# Patient Record
Sex: FEMALE | Race: WHITE | NOT HISPANIC OR LATINO | Employment: FULL TIME | URBAN - METROPOLITAN AREA
[De-identification: names, ages, dates, MRNs, and addresses within clinical notes are randomized per-mention and may not be internally consistent; named-entity substitution may affect disease eponyms.]

---

## 2024-03-07 ENCOUNTER — OFFICE VISIT (OUTPATIENT)
Dept: FAMILY MEDICINE | Facility: CLINIC | Age: 34
End: 2024-03-07
Payer: COMMERCIAL

## 2024-03-07 VITALS
TEMPERATURE: 97.1 F | RESPIRATION RATE: 14 BRPM | SYSTOLIC BLOOD PRESSURE: 109 MMHG | HEIGHT: 66 IN | OXYGEN SATURATION: 96 % | WEIGHT: 155 LBS | BODY MASS INDEX: 24.91 KG/M2 | HEART RATE: 94 BPM | DIASTOLIC BLOOD PRESSURE: 74 MMHG

## 2024-03-07 DIAGNOSIS — R11.0 NAUSEA: ICD-10-CM

## 2024-03-07 DIAGNOSIS — Z30.09 ENCOUNTER FOR COUNSELING REGARDING CONTRACEPTION: ICD-10-CM

## 2024-03-07 DIAGNOSIS — G47.00 INSOMNIA, UNSPECIFIED TYPE: Primary | ICD-10-CM

## 2024-03-07 DIAGNOSIS — Z13.220 SCREENING CHOLESTEROL LEVEL: ICD-10-CM

## 2024-03-07 DIAGNOSIS — L40.9 SCALP PSORIASIS: ICD-10-CM

## 2024-03-07 DIAGNOSIS — Z11.59 NEED FOR HEPATITIS C SCREENING TEST: ICD-10-CM

## 2024-03-07 DIAGNOSIS — R53.83 FATIGUE, UNSPECIFIED TYPE: ICD-10-CM

## 2024-03-07 LAB
ALBUMIN SERPL BCG-MCNC: 5 G/DL (ref 3.5–5.2)
ALP SERPL-CCNC: 82 U/L (ref 40–150)
ALT SERPL W P-5'-P-CCNC: 16 U/L (ref 0–50)
ANION GAP SERPL CALCULATED.3IONS-SCNC: 9 MMOL/L (ref 7–15)
AST SERPL W P-5'-P-CCNC: 18 U/L (ref 0–45)
BILIRUB SERPL-MCNC: 0.2 MG/DL
BUN SERPL-MCNC: 13.6 MG/DL (ref 6–20)
CALCIUM SERPL-MCNC: 9.5 MG/DL (ref 8.6–10)
CHLORIDE SERPL-SCNC: 103 MMOL/L (ref 98–107)
CHOLEST SERPL-MCNC: 148 MG/DL
CREAT SERPL-MCNC: 0.83 MG/DL (ref 0.51–0.95)
DEPRECATED HCO3 PLAS-SCNC: 28 MMOL/L (ref 22–29)
EGFRCR SERPLBLD CKD-EPI 2021: >90 ML/MIN/1.73M2
ERYTHROCYTE [DISTWIDTH] IN BLOOD BY AUTOMATED COUNT: 11.6 % (ref 10–15)
FASTING STATUS PATIENT QL REPORTED: YES
FERRITIN SERPL-MCNC: 72 NG/ML (ref 6–175)
GLUCOSE SERPL-MCNC: 96 MG/DL (ref 70–99)
HCT VFR BLD AUTO: 40.4 % (ref 35–47)
HDLC SERPL-MCNC: 64 MG/DL
HGB BLD-MCNC: 13.9 G/DL (ref 11.7–15.7)
LDLC SERPL CALC-MCNC: 76 MG/DL
MCH RBC QN AUTO: 30.7 PG (ref 26.5–33)
MCHC RBC AUTO-ENTMCNC: 34.4 G/DL (ref 31.5–36.5)
MCV RBC AUTO: 89 FL (ref 78–100)
NONHDLC SERPL-MCNC: 84 MG/DL
PLATELET # BLD AUTO: 328 10E3/UL (ref 150–450)
POTASSIUM SERPL-SCNC: 4.1 MMOL/L (ref 3.4–5.3)
PROT SERPL-MCNC: 7.3 G/DL (ref 6.4–8.3)
RBC # BLD AUTO: 4.53 10E6/UL (ref 3.8–5.2)
SODIUM SERPL-SCNC: 140 MMOL/L (ref 135–145)
TRIGL SERPL-MCNC: 40 MG/DL
TSH SERPL DL<=0.005 MIU/L-ACNC: 1.13 UIU/ML (ref 0.3–4.2)
WBC # BLD AUTO: 5.1 10E3/UL (ref 4–11)

## 2024-03-07 PROCEDURE — 85027 COMPLETE CBC AUTOMATED: CPT | Performed by: FAMILY MEDICINE

## 2024-03-07 PROCEDURE — 84443 ASSAY THYROID STIM HORMONE: CPT | Performed by: FAMILY MEDICINE

## 2024-03-07 PROCEDURE — 99204 OFFICE O/P NEW MOD 45 MIN: CPT | Performed by: FAMILY MEDICINE

## 2024-03-07 PROCEDURE — 80053 COMPREHEN METABOLIC PANEL: CPT | Performed by: FAMILY MEDICINE

## 2024-03-07 PROCEDURE — 86803 HEPATITIS C AB TEST: CPT | Performed by: FAMILY MEDICINE

## 2024-03-07 PROCEDURE — 36415 COLL VENOUS BLD VENIPUNCTURE: CPT | Performed by: FAMILY MEDICINE

## 2024-03-07 PROCEDURE — 80061 LIPID PANEL: CPT | Performed by: FAMILY MEDICINE

## 2024-03-07 PROCEDURE — 82728 ASSAY OF FERRITIN: CPT | Performed by: FAMILY MEDICINE

## 2024-03-07 RX ORDER — COPPER 313.4 MG/1
1 INTRAUTERINE DEVICE INTRAUTERINE ONCE
COMMUNITY

## 2024-03-07 RX ORDER — CLOBETASOL PROPIONATE 0.5 MG/G
AEROSOL, FOAM TOPICAL 2 TIMES DAILY PRN
COMMUNITY
Start: 2023-06-27 | End: 2024-03-07

## 2024-03-07 RX ORDER — ONDANSETRON 4 MG/1
4 TABLET, ORALLY DISINTEGRATING ORAL EVERY 8 HOURS PRN
Qty: 20 TABLET | Refills: 0 | Status: SHIPPED | OUTPATIENT
Start: 2024-03-07

## 2024-03-07 RX ORDER — TRAZODONE HYDROCHLORIDE 50 MG/1
50-100 TABLET, FILM COATED ORAL AT BEDTIME
Qty: 60 TABLET | Refills: 3 | Status: SHIPPED | OUTPATIENT
Start: 2024-03-07

## 2024-03-07 RX ORDER — CLOBETASOL PROPIONATE 0.5 MG/G
AEROSOL, FOAM TOPICAL 2 TIMES DAILY PRN
Qty: 100 G | Refills: 3 | Status: SHIPPED | OUTPATIENT
Start: 2024-03-07

## 2024-03-07 ASSESSMENT — PAIN SCALES - GENERAL: PAINLEVEL: NO PAIN (0)

## 2024-03-07 NOTE — PROGRESS NOTES
Assessment & Plan     Insomnia, unspecified type  We discussed treatment options and she was given a prescription for trazodone.  She can titrate the dose up to 100 mg at bedtime if needed.  She will continue to work on sleep hygiene strategies.  - traZODone (DESYREL) 50 MG tablet; Take 1-2 tablets ( mg) by mouth at bedtime    Scalp psoriasis  She was given a refill of clobetasol and a referral to dermatology.  - Adult Dermatology  Referral; Future  - clobetasol propionate (OLUX) 0.05 % external foam; Apply topically 2 times daily as needed (2x as day for 2 weeks when flare ups happen)    Need for hepatitis C screening test  - Hepatitis C Screen Reflex to HCV RNA Quant and Genotype; Future  - Hepatitis C Screen Reflex to HCV RNA Quant and Genotype    Screening cholesterol level  We are going to check nonfasting cholesterol panel today.  - Lipid Profile (Chol, Trig, HDL, LDL calc); Future  - Lipid Profile (Chol, Trig, HDL, LDL calc)    Fatigue, unspecified type  She has struggled with fatigue for quite some time.  Symptoms may be due to the insomnia or related to stress from work.  We are checking labs as listed below to rule out any metabolic causes.  There is a strong family history of thyroid disease as well.  Her thyroid testing was normal in 2022.    - TSH with free T4 reflex; Future  - CBC with platelets; Future  - Ferritin; Future  - Comprehensive metabolic panel (BMP + Alb, Alk Phos, ALT, AST, Total. Bili, TP); Future  - TSH with free T4 reflex  - CBC with platelets  - Ferritin  - Comprehensive metabolic panel (BMP + Alb, Alk Phos, ALT, AST, Total. Bili, TP)    Nausea  She was given a prescription for Zofran to be used as needed.  - ondansetron (ZOFRAN ODT) 4 MG ODT tab; Take 1 tablet (4 mg) by mouth every 8 hours as needed for nausea    Contraception counseling  She describes having quite a bit of vaginal bleeding with the ParaGard IUD.  She reports that this was placed in 2017.  She is  hoping to have it removed and replaced with Mirena.  She describes having IUDs in the past and has never had problems getting them inserted or removed.                  Carolina Ruiz is a 33 year old, presenting for the following health issues:  Establish [facility]        3/7/2024    10:41 AM   Additional Questions   Roomed by Paco OLIVAREZ     Via the Health Maintenance questionnaire, the patient has reported the following services have been completed -Cervical Cancer Screening, this information has been sent to the abstraction team.  History of Present Illness       Reason for visit:  Establish care    She eats 2-3 servings of fruits and vegetables daily.She consumes 0 sweetened beverage(s) daily.She exercises with enough effort to increase her heart rate 20 to 29 minutes per day.  She exercises with enough effort to increase her heart rate 3 or less days per week.   She is taking medications regularly.     Medication Refills    Sleep Issues  -Wakes up frequently throughout the night.  She has tried melatonin which does not help much.  She has also been working on sleep hygiene strategies, but the symptoms continue to persist.  She is interested in trying a medication if possible.  -No real issues falling asleep    She has struggled with fatigue for quite some time.  Symptoms may be due to the insomnia or related to stress from work.  Lab workup in the past has been unrevealing.  There is a strong family history of thyroid disease as well.  Her thyroid testing was normal in 2022.    She describes having quite a bit of vaginal bleeding with the ParaGard IUD.  She reports that this was placed in 2017.  She is hoping to have it removed and replaced with Mirena.  She describes having IUDs in the past and has never had problems getting them inserted or removed.    She has a history of scalp psoriasis and uses clobetasol shampoo.  She has not seen dermatology recently for this and needs to establish care with a new  "provider.    She describes having nausea symptoms occasionally when traveling to [place].  She has a trip coming up and is requesting a small prescription of Zofran.    Social history: She works as a hospitalist at [employer]                    Objective    /74   Pulse 94   Temp 97.1  F (36.2  C) (Temporal)   Resp 14   Ht 1.683 m (5' 6.25\")   Wt 70.3 kg (155 lb)   LMP 03/04/2024 (Exact Date)   SpO2 96%   BMI 24.83 kg/m    Body mass index is 24.83 kg/m .  Physical Exam   GENERAL: alert and no distress  EYES: Eyes grossly normal to inspection, PERRL and conjunctivae and sclerae normal  HENT: ear canals and TM's normal, nose and mouth without ulcers or lesions  NECK: no adenopathy, no asymmetry, masses, or scars  RESP: lungs clear to auscultation - no rales, rhonchi or wheezes  CV: regular rate and rhythm, normal S1 S2, no S3 or S4, no murmur, click or rub, no peripheral edema  ABDOMEN: soft, nontender, no hepatosplenomegaly, no masses and bowel sounds normal  MS: no gross musculoskeletal defects noted, no edema  SKIN: There is a scaly plaque-like rash consistent with psoriasis in the posterior scalp.  NEURO: Normal strength and tone, mentation intact and speech normal  PSYCH: mentation appears normal, affect normal/bright            Signed Electronically by: Luis Braden, DO    "

## 2024-03-08 LAB — HCV AB SERPL QL IA: NONREACTIVE

## 2024-03-10 ENCOUNTER — HEALTH MAINTENANCE LETTER (OUTPATIENT)
Age: 34
End: 2024-03-10

## 2024-03-11 ENCOUNTER — TELEPHONE (OUTPATIENT)
Dept: FAMILY MEDICINE | Facility: CLINIC | Age: 34
End: 2024-03-11

## 2024-03-11 NOTE — TELEPHONE ENCOUNTER
Called patient and lvm to call clinic back at [phone].   When patient calls back, please schedule her for iud replacement, requesting mirena w/ FS.   Please see DE's note below for details.     Luis Gaviria, DO  [facility]  Please reach out to this patient and help her schedule an IUD removal and insertion.  She would like to have her ParaGard IUD removed and get the Mirena.  I spoke with Dr. Macias who would be happy to see this patient for this procedure without a prior consultation.  Thank you, GWEN JENSEN

## 2024-09-30 ENCOUNTER — OFFICE VISIT (OUTPATIENT)
Dept: OBGYN | Facility: CLINIC | Age: 34
End: 2024-09-30
Payer: COMMERCIAL

## 2024-09-30 VITALS
OXYGEN SATURATION: 97 % | SYSTOLIC BLOOD PRESSURE: 104 MMHG | BODY MASS INDEX: 25.31 KG/M2 | TEMPERATURE: 98 F | HEART RATE: 79 BPM | DIASTOLIC BLOOD PRESSURE: 71 MMHG | WEIGHT: 158 LBS

## 2024-09-30 DIAGNOSIS — N94.10 DYSPAREUNIA IN FEMALE: ICD-10-CM

## 2024-09-30 DIAGNOSIS — Z30.433 ENCOUNTER FOR REMOVAL AND REINSERTION OF INTRAUTERINE CONTRACEPTIVE DEVICE: Primary | ICD-10-CM

## 2024-09-30 PROCEDURE — 58300 INSERT INTRAUTERINE DEVICE: CPT | Performed by: OBSTETRICS & GYNECOLOGY

## 2024-09-30 PROCEDURE — 99459 PELVIC EXAMINATION: CPT | Performed by: OBSTETRICS & GYNECOLOGY

## 2024-09-30 PROCEDURE — 99203 OFFICE O/P NEW LOW 30 MIN: CPT | Mod: 25 | Performed by: OBSTETRICS & GYNECOLOGY

## 2024-09-30 PROCEDURE — 58301 REMOVE INTRAUTERINE DEVICE: CPT | Performed by: OBSTETRICS & GYNECOLOGY

## 2024-09-30 NOTE — PATIENT INSTRUCTIONS
IUD information:     Benefits: The IUD can be 97-99% effective when carefully following directions regarding use. It can be more effective if used with additional contraception. IUD containing progestin may decrease menstrual flow and menstrual cramping.     Risks/Side Effects: include but are not limited to spotting, bleeding, hemorrhage, or anemia: cramping or pain: partial or complete expulsion of device; lost IUD strings; uterine or cervical perforation; embedding of IUD in the uterine wall; increased risk of pelvic inflammatory disease. Women who become pregnant with an IUD in place are at a higher risk for ectopic pregnancy should a pregnancy occur with an IUD in situ. There is a higher rate of miscarriage when pregnancy occurs with IUD in place.    Warning signs: Please call clinic if you have abnormal spotting or heavy bleeding, abdominal pain, dyspareunia, fever, chills, flu like symptoms, or unable to locate strings of IUD, or strings are longer or shorter than expected.    You are encouraged to use condoms for prevention of STD. You may also need back up contraception for 7 days with your IUD. You may use pain medications (ibuprofen) as needed for mild to moderate pain. Please follow-up in clinic in 4-6 weeks for IUD string check if unable to find strings or as directed by provider.    What Mirena Users May Expect    What to watch for right after Mirena is placed  Some women may experience uterine cramps, bleeding, and/or dizziness during and right after Mirena is placed. To help minimize the cramps, you may taken ibuprofen 600 mg with food prior to your appointment. These symptoms should improve over the next 24 hours.  Mild cramping may be present for a few days after your placement  As a follow up, you should check your strings on 4 weeks or visit your clinic once in the first 4 to 12 weeks after Mirena is placed to make sure it is in the right position. After that, Mirena can be checked once a year as  part of your routine exam.    Please use a back-up method (abstinence or condoms) for 5 days after placement.    Your periods may change  For the first 3 to 6 months, your monthly period may become irregular. You may also have frequent spotting or light bleeding. A few women have heavy bleeding during this time. After your body adjusts, the number of bleeding days is likely to decrease (but may remain irregular), and you may even find that your periods stop altogether for as long as Mirena is in place. Around the end of the third month of use, you may see up to a 75% reduction in the amount of menstrual bleeding. By one year, about 1 out of 5 users may hay have no period at all. At the end of two years, 70% have little or no bleeding. Your periods will return rapidly once Mirena is removed.     Mirena Strings  You may check your own Mirena strings by inserting a finger into the vagina and feeling the strings as they exit the cervix.  The strings will initially feel firm, like fishing line, but will soften over a few weeks.  After the strings have softened, you or your partner should not be able to feel the strings during intercourse.  If you can feel the IUD, see your healthcare provider to have the position confirmed.  You may use tampons with Mirena in place.    Mirena does not protect against HIV or STDs.  Mirena does not prevent the formation of ovarian cysts.  Mirena does not typically reduce acne or cause weight gain or mood changes.    Please call [facility] at [phone] if you have questions or concerns.    For more information:  [email or web address]

## 2024-09-30 NOTE — PROGRESS NOTES
[facility]- OBGYN    CC: heavy period with copper IUD    S:Sara Martinez is a 34 year old  who presents today for copper IUD removal and Mirena IUD insertion.  Patient reports she had copper IUD placed in .  This was at the suggestion of her provider because she was having acne issues with Denise IUD use.  She notes her periods prior to getting the copper IUD were lighter and less painful.  Currently periods with copper IUD are heavy and uncomfortable.  She is interested in switching back to progesterone IUD.  She also notes a history of pain with insertion during intercourse.  Had worked with pelvic PT in the past at [facility] and didn't find it very helpful.  She is interested in giving pelvic floor PT another chance.    OBGYN Hx:     Patient's last menstrual period was 2024.  Menses:regular, monthly  Sexually active with one male partner  STD Hx:none  Pap hx: 22 NILM HPV negative.  Denies any history of abnormal.     PMH: psoriasis  PSH:none  Meds:trazodone PRN  Allergies:NKDA  SH: denies any tobacco use  O: Patient Vitals for the past 24 hrs:   BP Temp Temp src Pulse SpO2 Weight   24 1421 104/71 98  F (36.7  C) Oral 79 97 % 71.7 kg (158 lb)     Exam: normal appearing external genitalia, normal appearing vaginal mucosa, normal appearing cervix with IUD strings visible.  Small amount of thin white vaginal discharge.     Is a pregnancy test required: No.  Was a consent obtained?  Yes    Sara Martinez understands she may have the IUD removed at any time. IUD should be removed by a health care provider and the current IUD will be removed today.    The entire removal and insertion procedure was reviewed with the patient, including care after placement.    PROCEDURE:    Patient was counseled on pain control options including lidocaine gel and paracervical block.  Patient opts for lidocaine gel.   A medium gee speculum exam was performed and the cervix was  visualized. The IUD string was visualized. Using ring forceps, the string was grasped and the IUD removed intact.    Under sterile technique, cervix was visualized with speculum and prepped with Betadine solution swab x 3. Tenaculum was placed for stability. The uterus was gently straightened and sounded to 7.5 cm using endometrial pipelle. IUD prepared for placement, and IUD inserted according to 's instructions without difficulty or significant ressitance, and deployed at the fundus. The strings were visualized and trimmed to 2.5 cm from the external os. Tenaculum was removed and hemostasis noted. Speculum removed.  Patient tolerated procedure well.    EBL: minimal    Complications: none      POST PROCEDURE:    Given 's handouts, including when to have IUD removed, list of danger s/sx, side effects and follow up recommended. Encouraged condom use for prevention of STD. Advised to call for any fever, for prolonged or severe pain or bleeding, abnormal vaginal dischage, or unable to palpate strings. She was advised to use pain medications (ibuprofen) as needed for mild to moderate pain. Advised to follow-up in clinic in 4-6 weeks for IUD string check if unable to find strings or as directed by provider.     (Z30.433) Encounter for removal and reinsertion of intrauterine contraceptive device  (primary encounter diagnosis)  Comment: see above  Plan: levonorgestrel (MIRENA) 52 MG (20 mcg/day) IUD         1 each, INSERTION INTRAUTERINE DEVICE, REMOVE         INTRAUTERINE DEVICE    (N94.10) Dyspareunia in female  Comment: Patient with pain with insertion.  Previously told it was due to hypertonic pelvic floor muscles.  Interested in pelvic floor PT referral  Plan: Physical Therapy  Referral          Nataliya Echevarria MD

## 2024-11-08 ENCOUNTER — THERAPY VISIT (OUTPATIENT)
Dept: PHYSICAL THERAPY | Facility: CLINIC | Age: 34
End: 2024-11-08
Payer: COMMERCIAL

## 2024-11-08 DIAGNOSIS — N94.10 DYSPAREUNIA IN FEMALE: ICD-10-CM

## 2024-11-08 DIAGNOSIS — R10.2 PELVIC PAIN IN FEMALE: Primary | ICD-10-CM

## 2024-11-08 PROCEDURE — 97161 PT EVAL LOW COMPLEX 20 MIN: CPT | Mod: GP

## 2024-11-08 PROCEDURE — 97530 THERAPEUTIC ACTIVITIES: CPT | Mod: GP

## 2024-11-08 NOTE — PROGRESS NOTES
PHYSICAL THERAPY EVALUATION  Type of Visit: Evaluation              Subjective         Presenting condition or subjective complaint: (Patient-Rptd) Dyspareunia  Patient reports to outpatient physical therapy with pelvic pain that started over a year ago. She has pain with initial insertion of tampons and then gets better. Also has discomfort with initial penetration - used to go away and now just stays. Uses lubrication and that has not been helpful. She has tried pelvic PT in the past and tried an wand which did seem as helpful. No issues with voiding. Has a hx of constipation. Seemed to start around the same time as the pelvic pain. Hard to pass and hard stool. Has tried stool softeners. Has tried a squatty potty as well. Denies back or hip pain.    Date of onset: 09/30/24 (date of order)    Relevant medical history:     Past Medical History:   Diagnosis Date    Psoriasis      Dates & types of surgery:    No past surgical history on file.  Prior diagnostic imaging/testing results:       Prior therapy history for the same diagnosis, illness or injury: (Patient-Rptd) Yes      Prior Level of Function  Transfers: Independent  Ambulation: Independent  ADL: Independent  IADL:     Living Environment  Social support: (Patient-Rptd) With a significant other or spouse   Type of home: (Patient-Rptd) House   Stairs to enter the home: (Patient-Rptd) Yes   Is there a railing: (Patient-Rptd) Yes     Ramp: (Patient-Rptd) No   Stairs inside the home: (Patient-Rptd) Yes   Is there a railing: (Patient-Rptd) Yes     Help at home: (Patient-Rptd) None  Equipment owned:       Employment: (Patient-Rptd) Yes    Hobbies/Interests:      Patient goals for therapy:      Pain assessment: Pain present     Objective      PELVIC EVALUATION  ADDITIONAL HISTORY:  Sex assigned at birth: (Patient-Rptd) Female  Gender identity: (Patient-Rptd) Female    Pronouns: (Patient-Rptd) She/Her Hers      Bladder History:  Feels bladder filling:    Triggers for  feeling of inability to wait to go to the bathroom: (Patient-Rptd) No    How long can you wait to urinate:    Gets up at night to urinate: (Patient-Rptd) No    Can stop the flow of urine when urinating: (Patient-Rptd) Yes  Volume of urine usually released: (Patient-Rptd) Average   Other issues:    Number of bladder infections in last 12 months:    Fluid intake per day:        Medications taken for bladder: (Patient-Rptd) No     Activities causing urine leak:      Amount of urine typically leaked:    Pads used to help with leaking:          Bowel History:  Frequency of bowel movement: (Patient-Rptd) 1 per day  Consistency of stool: (Patient-Rptd) Hard    Ignores the urge to defecate: (Patient-Rptd) No  Other bowel issues: (Patient-Rptd) Straining to have bowel movement  Length of time spent trying to have a bowel movement:      Sexual Function History:  Sexual orientation: (Patient-Rptd) Straight    Sexually active: (Patient-Rptd) Yes  Lubrication used: (Patient-Rptd) Yes (Patient-Rptd) Yes  Pelvic pain: (Patient-Rptd) Initial penetration (rectal or vaginal); Use of tampon    Pain or difficulty with orgasms/erection/ejaculation: (Patient-Rptd) No    State of menopause:    Hormone medications: (Patient-Rptd) No      Are you currently pregnant: (Patient-Rptd) No  Number of previous pregnancies: (Patient-Rptd) 0  Have you been diagnosed with pelvic prolapse or abdominal separation: (Patient-Rptd) No  Do you get regular exercise: (Patient-Rptd) Yes  Have you tried pelvic floor strengthening exercises for 4 weeks: (Patient-Rptd) No  Do you have any history of trauma that is relevant to your care that you d like to share: (Patient-Rptd) No      Discussed reason for referral regarding pelvic health needs and external/internal pelvic floor muscle examination with patient/guardian.  Opportunity provided to ask questions and verbal consent for assessment and intervention was given.    PAIN: Pain Level at Rest: 0/10  Pain Level  with Use: 8/10  Sharper pain during intercourse and no pain afterwards  POSTURE: WFL  LUMBAR SCREEN: AROM WNL  HIP SCREEN:  PROM: WNL flexion, ER and IR bilaterally  Strength:  WNL globally flexion, and and ext bilaterally    Functional Strength Testing: Double Leg Squat: Good technique/no significant findings    BREATHING SYMMETRY: WNL    PELVIC EXAM  External Visual Inspection:  At rest: Elevated perineal body  With voluntary pelvic floor contraction: Present  Relaxation of PFM: Non-relaxation    Integumentary:   Introitus: Tight    External Digital Palpation per Perineum:   Ischiocavernosis: Unremarkable  Bulbo cavernosis: Unremarkable  Transverse perineal: Unremarkable  Levator ani: Tightness  Perineal body: Unremarkable  Coccyx: not assessed    Internal Digital Palpation:  Per Vagina:  Tenderness  Myofascial Resistance to Palpation: Taut, initial layer, soft deeper layer  Digital Muscle Performance: P (Power): 3-4  Compensations: Gluts  Relaxation Post-Contraction: Partial/delayed relaxation    BIOFEEDBACK:  Position: Supine  Surface Electrodes: Perineal    Abdominals:     Perianals:   Baseline muscle activity: 5-7 in hooklying; 3-4 with legs extended microvolts  Peak Amplitude/MVC: 15 microvolts    Assessment & Plan   CLINICAL IMPRESSIONS  Medical Diagnosis: dyspareunia    Treatment Diagnosis:     Impression/Assessment: Patient is a 34 year old female with pelvic floor pain complaints.  The following significant findings have been identified: Pain, Decreased ROM/flexibility, Impaired muscle performance, and Decreased activity tolerance. These impairments interfere with their ability to perform self care tasks, recreational activities, and household mobility as compared to previous level of function.     Clinical Decision Making (Complexity):  Clinical Presentation: Stable/Uncomplicated  Clinical Presentation Rationale: based on medical and personal factors listed in PT evaluation  Clinical Decision Making  (Complexity): Low complexity    PLAN OF CARE  Treatment Interventions:  Modalities: Biofeedback, E-stim, Ultrasound  Interventions: Manual Therapy, Neuromuscular Re-education, Therapeutic Activity, Therapeutic Exercise, Self-Care/Home Management    Long Term Goals     PT Goal 1  Goal Identifier: intercourse  Goal Description: patient will report 1/10 pain or less with initial penetration  Rationale: to maximize safety and independence with performance of ADLs and functional tasks;to maximize safety and independence within the home;to maximize safety and independence with self cares  Target Date: 01/31/25  PT Goal 2  Goal Identifier: tampon use  Goal Description: patient will be able to insert a tampon with 1/10 pain or less  Rationale: to maximize safety and independence with performance of ADLs and functional tasks;to maximize safety and independence within the home;to maximize safety and independence with self cares  Target Date: 01/31/25      Frequency of Treatment:    Duration of Treatment:      Recommended Referrals to Other Professionals: n/a at present  Education Assessment:   Learner/Method: Patient    Risks and benefits of evaluation/treatment have been explained.   Patient/Family/caregiver agrees with Plan of Care.     Evaluation Time:     PT Eval, Low Complexity Minutes (39029): 30     Signing Clinician: Shantel Jones, PT

## 2025-01-14 PROBLEM — N94.10 DYSPAREUNIA IN FEMALE: Status: RESOLVED | Noted: 2024-11-08 | Resolved: 2025-01-14

## 2025-01-14 PROBLEM — R10.2 PELVIC PAIN IN FEMALE: Status: RESOLVED | Noted: 2024-11-08 | Resolved: 2025-01-14

## 2025-03-16 ENCOUNTER — HEALTH MAINTENANCE LETTER (OUTPATIENT)
Age: 35
End: 2025-03-16

## 2025-07-18 DIAGNOSIS — L40.9 SCALP PSORIASIS: ICD-10-CM

## 2025-07-21 DIAGNOSIS — R11.0 NAUSEA: ICD-10-CM

## 2025-07-21 RX ORDER — ONDANSETRON 4 MG/1
4 TABLET, ORALLY DISINTEGRATING ORAL EVERY 8 HOURS PRN
Qty: 20 TABLET | Refills: 0 | Status: SHIPPED | OUTPATIENT
Start: 2025-07-21

## 2025-07-21 RX ORDER — CLOBETASOL PROPIONATE 0.5 MG/G
AEROSOL, FOAM TOPICAL
Qty: 100 G | Refills: 3 | Status: SHIPPED | OUTPATIENT
Start: 2025-07-21